# Patient Record
Sex: FEMALE | Race: AMERICAN INDIAN OR ALASKA NATIVE | NOT HISPANIC OR LATINO | URBAN - METROPOLITAN AREA
[De-identification: names, ages, dates, MRNs, and addresses within clinical notes are randomized per-mention and may not be internally consistent; named-entity substitution may affect disease eponyms.]

---

## 2019-09-29 ENCOUNTER — INPATIENT (INPATIENT)
Facility: HOSPITAL | Age: 49
LOS: 2 days | Discharge: HOME | End: 2019-10-02
Attending: INTERNAL MEDICINE | Admitting: INTERNAL MEDICINE
Payer: COMMERCIAL

## 2019-09-29 VITALS
SYSTOLIC BLOOD PRESSURE: 134 MMHG | RESPIRATION RATE: 22 BRPM | DIASTOLIC BLOOD PRESSURE: 86 MMHG | HEART RATE: 94 BPM | TEMPERATURE: 98 F | OXYGEN SATURATION: 100 %

## 2019-09-29 NOTE — ED ADULT TRIAGE NOTE - CHIEF COMPLAINT QUOTE
Pt. BIBEMS [facility] from Home. c/o Sudden onset of Shortness of Breath with Dizziness. Denies CP Nausea Vomiting Diaphoresis Palpitation rash cough Headache. PMHx HTN HLD Hyperthyroidism No sick contact No recent travel.

## 2019-09-30 DIAGNOSIS — E78.5 HYPERLIPIDEMIA, UNSPECIFIED: ICD-10-CM

## 2019-09-30 DIAGNOSIS — I45.10 UNSPECIFIED RIGHT BUNDLE-BRANCH BLOCK: ICD-10-CM

## 2019-09-30 DIAGNOSIS — E05.10 THYROTOXICOSIS WITH TOXIC SINGLE THYROID NODULE WITHOUT THYROTOXIC CRISIS OR STORM: ICD-10-CM

## 2019-09-30 DIAGNOSIS — I10 ESSENTIAL (PRIMARY) HYPERTENSION: ICD-10-CM

## 2019-09-30 DIAGNOSIS — Z29.9 ENCOUNTER FOR PROPHYLACTIC MEASURES, UNSPECIFIED: ICD-10-CM

## 2019-09-30 DIAGNOSIS — R06.02 SHORTNESS OF BREATH: ICD-10-CM

## 2019-09-30 LAB
ALBUMIN SERPL ELPH-MCNC: 4.6 G/DL — SIGNIFICANT CHANGE UP (ref 3.3–5)
ALP SERPL-CCNC: 60 U/L — SIGNIFICANT CHANGE UP (ref 40–120)
ALT FLD-CCNC: 21 U/L — SIGNIFICANT CHANGE UP (ref 4–33)
ANION GAP SERPL CALC-SCNC: 15 MMO/L — HIGH (ref 7–14)
APPEARANCE UR: CLEAR — SIGNIFICANT CHANGE UP
AST SERPL-CCNC: 17 U/L — SIGNIFICANT CHANGE UP (ref 4–32)
BACTERIA # UR AUTO: NEGATIVE — SIGNIFICANT CHANGE UP
BASOPHILS # BLD AUTO: 0.03 K/UL — SIGNIFICANT CHANGE UP (ref 0–0.2)
BASOPHILS NFR BLD AUTO: 0.2 % — SIGNIFICANT CHANGE UP (ref 0–2)
BILIRUB SERPL-MCNC: 0.4 MG/DL — SIGNIFICANT CHANGE UP (ref 0.2–1.2)
BILIRUB UR-MCNC: NEGATIVE — SIGNIFICANT CHANGE UP
BLOOD UR QL VISUAL: NEGATIVE — SIGNIFICANT CHANGE UP
BUN SERPL-MCNC: 15 MG/DL — SIGNIFICANT CHANGE UP (ref 7–23)
CALCIUM SERPL-MCNC: 10.5 MG/DL — SIGNIFICANT CHANGE UP (ref 8.4–10.5)
CHLORIDE SERPL-SCNC: 106 MMOL/L — SIGNIFICANT CHANGE UP (ref 98–107)
CO2 SERPL-SCNC: 23 MMOL/L — SIGNIFICANT CHANGE UP (ref 22–31)
COLOR SPEC: SIGNIFICANT CHANGE UP
CREAT SERPL-MCNC: 0.63 MG/DL — SIGNIFICANT CHANGE UP (ref 0.5–1.3)
D DIMER BLD IA.RAPID-MCNC: < 150 NG/ML — SIGNIFICANT CHANGE UP
EOSINOPHIL # BLD AUTO: 0.03 K/UL — SIGNIFICANT CHANGE UP (ref 0–0.5)
EOSINOPHIL NFR BLD AUTO: 0.2 % — SIGNIFICANT CHANGE UP (ref 0–6)
GLUCOSE SERPL-MCNC: 122 MG/DL — HIGH (ref 70–99)
GLUCOSE UR-MCNC: NEGATIVE — SIGNIFICANT CHANGE UP
HCT VFR BLD CALC: 46.6 % — HIGH (ref 34.5–45)
HGB BLD-MCNC: 14.2 G/DL — SIGNIFICANT CHANGE UP (ref 11.5–15.5)
HYALINE CASTS # UR AUTO: NEGATIVE — SIGNIFICANT CHANGE UP
IMM GRANULOCYTES NFR BLD AUTO: 0.4 % — SIGNIFICANT CHANGE UP (ref 0–1.5)
KETONES UR-MCNC: SIGNIFICANT CHANGE UP
LEUKOCYTE ESTERASE UR-ACNC: NEGATIVE — SIGNIFICANT CHANGE UP
LYMPHOCYTES # BLD AUTO: 17.2 % — SIGNIFICANT CHANGE UP (ref 13–44)
LYMPHOCYTES # BLD AUTO: 2.37 K/UL — SIGNIFICANT CHANGE UP (ref 1–3.3)
MCHC RBC-ENTMCNC: 27.4 PG — SIGNIFICANT CHANGE UP (ref 27–34)
MCHC RBC-ENTMCNC: 30.5 % — LOW (ref 32–36)
MCV RBC AUTO: 90 FL — SIGNIFICANT CHANGE UP (ref 80–100)
MONOCYTES # BLD AUTO: 0.93 K/UL — HIGH (ref 0–0.9)
MONOCYTES NFR BLD AUTO: 6.8 % — SIGNIFICANT CHANGE UP (ref 2–14)
NEUTROPHILS # BLD AUTO: 10.35 K/UL — HIGH (ref 1.8–7.4)
NEUTROPHILS NFR BLD AUTO: 75.2 % — SIGNIFICANT CHANGE UP (ref 43–77)
NITRITE UR-MCNC: NEGATIVE — SIGNIFICANT CHANGE UP
NRBC # FLD: 0 K/UL — SIGNIFICANT CHANGE UP (ref 0–0)
PH UR: 8 — SIGNIFICANT CHANGE UP (ref 5–8)
PLATELET # BLD AUTO: 324 K/UL — SIGNIFICANT CHANGE UP (ref 150–400)
PMV BLD: 9.9 FL — SIGNIFICANT CHANGE UP (ref 7–13)
POTASSIUM SERPL-MCNC: 4.1 MMOL/L — SIGNIFICANT CHANGE UP (ref 3.5–5.3)
POTASSIUM SERPL-SCNC: 4.1 MMOL/L — SIGNIFICANT CHANGE UP (ref 3.5–5.3)
PROT SERPL-MCNC: 7.7 G/DL — SIGNIFICANT CHANGE UP (ref 6–8.3)
PROT UR-MCNC: 20 — SIGNIFICANT CHANGE UP
RBC # BLD: 5.18 M/UL — SIGNIFICANT CHANGE UP (ref 3.8–5.2)
RBC # FLD: 12.7 % — SIGNIFICANT CHANGE UP (ref 10.3–14.5)
RBC CASTS # UR COMP ASSIST: SIGNIFICANT CHANGE UP (ref 0–?)
SODIUM SERPL-SCNC: 144 MMOL/L — SIGNIFICANT CHANGE UP (ref 135–145)
SP GR SPEC: 1.02 — SIGNIFICANT CHANGE UP (ref 1–1.04)
SQUAMOUS # UR AUTO: SIGNIFICANT CHANGE UP
TROPONIN T, HIGH SENSITIVITY: < 6 NG/L — SIGNIFICANT CHANGE UP (ref ?–14)
TSH SERPL-MCNC: 1.55 UIU/ML — SIGNIFICANT CHANGE UP (ref 0.27–4.2)
UROBILINOGEN FLD QL: NORMAL — SIGNIFICANT CHANGE UP
WBC # BLD: 13.77 K/UL — HIGH (ref 3.8–10.5)
WBC # FLD AUTO: 13.77 K/UL — HIGH (ref 3.8–10.5)
WBC UR QL: SIGNIFICANT CHANGE UP (ref 0–?)

## 2019-09-30 PROCEDURE — 71045 X-RAY EXAM CHEST 1 VIEW: CPT | Mod: 26

## 2019-09-30 PROCEDURE — 70491 CT SOFT TISSUE NECK W/DYE: CPT | Mod: 26

## 2019-09-30 RX ORDER — ATORVASTATIN CALCIUM 80 MG/1
10 TABLET, FILM COATED ORAL AT BEDTIME
Refills: 0 | Status: DISCONTINUED | OUTPATIENT
Start: 2019-09-30 | End: 2019-10-02

## 2019-09-30 RX ORDER — ATORVASTATIN CALCIUM 80 MG/1
1 TABLET, FILM COATED ORAL
Qty: 0 | Refills: 0 | DISCHARGE

## 2019-09-30 RX ORDER — ZOLPIDEM TARTRATE 10 MG/1
5 TABLET ORAL AT BEDTIME
Refills: 0 | Status: DISCONTINUED | OUTPATIENT
Start: 2019-09-30 | End: 2019-10-02

## 2019-09-30 RX ORDER — LOSARTAN POTASSIUM 100 MG/1
1 TABLET, FILM COATED ORAL
Qty: 0 | Refills: 0 | DISCHARGE

## 2019-09-30 RX ORDER — LOSARTAN POTASSIUM 100 MG/1
100 TABLET, FILM COATED ORAL DAILY
Refills: 0 | Status: DISCONTINUED | OUTPATIENT
Start: 2019-09-30 | End: 2019-10-02

## 2019-09-30 RX ORDER — ZOLPIDEM TARTRATE 10 MG/1
1 TABLET ORAL
Qty: 0 | Refills: 0 | DISCHARGE

## 2019-09-30 RX ADMIN — LOSARTAN POTASSIUM 100 MILLIGRAM(S): 100 TABLET, FILM COATED ORAL at 13:51

## 2019-09-30 RX ADMIN — ZOLPIDEM TARTRATE 5 MILLIGRAM(S): 10 TABLET ORAL at 22:51

## 2019-09-30 RX ADMIN — ATORVASTATIN CALCIUM 10 MILLIGRAM(S): 80 TABLET, FILM COATED ORAL at 21:47

## 2019-09-30 NOTE — H&P ADULT - NEGATIVE MUSCULOSKELETAL SYMPTOMS
no muscle cramps/no muscle weakness/no back pain/no leg pain L/no joint swelling/no myalgia/no arm pain L/no arm pain R/no arthralgia/no leg pain R

## 2019-09-30 NOTE — H&P ADULT - NSHPLABSRESULTS_GEN_ALL_CORE
14.2   13.77 )-----------( 324      ( 30 Sep 2019 01:07 )             46.6   09-30    144  |  106  |  15  ----------------------------<  122<H>  4.1   |  23  |  0.63    Ca    10.5      30 Sep 2019 01:07    TPro  7.7  /  Alb  4.6  /  TBili  0.4  /  DBili  x   /  AST  17  /  ALT  21  /  AlkPhos  60  09-30    Troponin: <6    D-dimer: <150    U/A: Normal    EKG: NSR @ 76 with RBBB    CXR: Clear lungs 14.2   13.77 )-----------( 324      ( 30 Sep 2019 01:07 )             46.6   09-30    144  |  106  |  15  ----------------------------<  122<H>  4.1   |  23  |  0.63    Ca    10.5      30 Sep 2019 01:07    TPro  7.7  /  Alb  4.6  /  TBili  0.4  /  DBili  x   /  AST  17  /  ALT  21  /  AlkPhos  60  09-30    Troponin: <6    D-dimer: <150    U/A: Normal    EKG: NSR @ 76 with RBBB.    CXR: Clear lungs

## 2019-09-30 NOTE — H&P ADULT - NEGATIVE OPHTHALMOLOGIC SYMPTOMS
no lacrimation R/no discharge R/no irritation L/no loss of vision R/no scleral injection R/no blurred vision L/no irritation R/no pain R/no diplopia/no photophobia/no lacrimation L/no loss of vision L/no scleral injection L/no blurred vision R/no discharge L/no pain L

## 2019-09-30 NOTE — H&P ADULT - MUSCULOSKELETAL
details… detailed exam ROM intact/normal/no joint swelling/no joint erythema/no joint warmth/normal strength/no calf tenderness

## 2019-09-30 NOTE — CHART NOTE - NSCHARTNOTEFT_GEN_A_CORE
[facility]  Head & Neck Surgery Procedure Note    Name:                  Oneil Jain        Surgeon:   Dante Linares MD	  				  Date of Procedure: 09/30/2019                       Assistant:  None    Record Number:	[id number]                             Anesthesia:  Topical Anesthesia     Preoperative diagnosis:	Acute maxillary sinusitis, unspecified (J01.00);  Acute Pansinusitis (J01.40)    Postoperative diagnosis:	Same    Procedure:	              Bilateral maxillary sinus endoscopy  (72601)    INDICATION:  The patient is a 48 y/o female with PMH of HTN, HLD, hyperthyroid with multiple thyroid nodules on Methimazole, arthritis who presents to [facility] ED complaining of SOB for 3 days and dizziness last night.  Pt states that she feels like air is "getting trapped in her airway" and reports orthopnea (sleeps with 2 pillows).  Pt admits to history of chronic stiff neck for which she receives cupping treatments and history of chronic headaches. Patient with chronic headaches/sinus headaches and post nasal drip.  ENT consulted for further evaluation and management. Pt reports she's had known multinodular goiter for 10+ years and has been on methimazole for a while now. Initially patient was on a higher dose of methimazole, but because she was well controlled, her dose was decreased over a year ago. She has been euthyroid since, per patient. At baseline she has mild shortness of breath and some discomfort when lying flat, but has had no acute respiratory issues. About 3-4 days ago she's had acute worsening of her shortness of breath, denies any fevers or other URI symptoms. Afebrile in ED. She denies any difficult swallowing. No changes in voice. No history of surgery or radiation to neck.     PROCEDURE:  The patient was seen and her nasal cavities were prepped and sprayed with topical neosynephrine anesthesia.   Following this, a zero degree flexible endoscope was passed into the left nasal vault, and passed posteriorly to the nasopharynx.  There was no evidence of any blood emanating from the left posterior superior lateral nasal wall. The scope was then slowly withdrawn and then rotated superiorly to visualize the middle turbinate and the inferior meatus and osteomeatal complex. The OMC on the left side appeared patent with no evidence of pus or obstruction.  The Maxillary sinus on the left side was then accessed through the inferior meatus.  The maxillary sinus had mild to moderate amount of mucoserous fluid within it without any evidence of masses or lesions.  The frontal duct was then inspected and appeared clear without any mucoid material flowing from it.  The Septum appeared straight.  The scope was then slowly withdrawn.  The zero degree endoscope was then introduced into the right nasal cavity and passed posteriorly to the nasopharynx. There were no masses visible.  There was no evidence of any bleeding emanating from the right posterior septum.  The endoscope was then rotated superiorly to visualize the middle turbinate and the inferior meatus and osteomeatal complex. The Maxillary sinus on the right side was then accessed via the inferior meatus.  There was a minimal amount of mucoserous fluid within the maxillary sinus with no evidence of any masses or pus.  Again the septum appeared to be straight.  The scope was then withdrawn.  The patient tolerated the procedure well.

## 2019-09-30 NOTE — ED PROVIDER NOTE - PHYSICAL EXAMINATION
General: NAD, good hygiene, well developed  HENT: Atraumatic, EOMI, no conjunctivae injection, moist mucosa, no post. oropharynx erythema, exudates  Neck: normal ROM and trachea midline   Cardiovascular: RRR, S1&2, no M or R, radial pulses equal and b/l  Respiratory: CTABL, no wheezes or crackles, no decreased breath sounds  Abdominal: soft and non-tender non distended, neg for guarding, malik's sign, rovsing's sign, mcburney's sign, no CVA tenderness   Extremities: no edema of the legs/feet, DP/PT equal b/l  Skin: warm, well perfused  Neurologic: nonfocal, AAOx3  Psych: normal mood and affect

## 2019-09-30 NOTE — CONSULT NOTE ADULT - SUBJECTIVE AND OBJECTIVE BOX
HPI:  "Pt is a 48 y/o female with PMH of HTN, HLD, hyperthyroid with multiple thyroid nodules on Methimazole, arthritis who presents to [facility] ED complaining of SOB for 3 days and dizziness last night.  Pt states that she feels like air is "getting trapped in her airway" and reports orthopnea (sleeps with 2 pillows).  Pt admits to history of chronic stiff neck for which she receives cupping treatments and history of chronic headaches. Patient with chronic headaches/sinus headaches and post nasal drip.  ENT consulted for further evaluation and management. Pt reports she's had known multinodular goiter for 10+ years and has been on methimazole for a while now. Initially patient was on a higher dose of methimazole, but because she was well controlled, her dose was decreased over a year ago. She has been euthyroid since, per patient. At baseline she has mild shortness of breath and some discomfort when lying flat, but has had no acute respiratory issues. About 3-4 days ago she's had acute worsening of her shortness of breath, denies any fevers or other URI symptoms. Afebrile in ED. She denies any difficult swallowing. No changes in voice. No history of surgery or radiation to neck.       PAST MEDICAL & SURGICAL HISTORY:  Arthritis  Hyperlipidemia  HTN (hypertension)  Thyroid nodule, toxic or with hyperthyroidism  No significant past surgical history    FAMILY HISTORY:  No pertinent family history in first degree relatives      MEDICATIONS  (STANDING):  atorvastatin 10 milliGRAM(s) Oral at bedtime  losartan 100 milliGRAM(s) Oral daily  methimazole 2.5 milliGRAM(s) Oral daily    MEDICATIONS  (PRN):  zolpidem 5 milliGRAM(s) Oral at bedtime PRN Insomnia  zolpidem 5 milliGRAM(s) Oral at bedtime PRN Insomnia    Allergies  No Known Allergies    Intolerances        REVIEW OF SYSTEMS:  CONSTITUTIONAL: No fever, weight loss, or fatigue  EYES: No eye pain, visual disturbances, or discharge  ENMT:  No difficulty hearing, tinnitus, vertigo; No sinus or throat pain  NECK: No pain or stiffness  BREASTS: No pain, masses, or nipple discharge  RESPIRATORY: No cough, wheezing, chills or hemoptysis; positive for shortness of breath  CARDIOVASCULAR: No chest pain, palpitations, dizziness, or leg swelling  GASTROINTESTINAL: No abdominal or epigastric pain. No nausea, vomiting, or hematemesis; No diarrhea or constipation. No melena or hematochezia.  GENITOURINARY: No dysuria, frequency, hematuria, or incontinence  NEUROLOGICAL: No headaches, memory loss, loss of strength, numbness, or tremors  SKIN: No itching, burning, rashes, or lesions   LYMPH NODES: No enlarged glands  ENDOCRINE: No heat or cold intolerance; No hair loss  MUSCULOSKELETAL: No joint pain or swelling; No muscle, back, or extremity pain  PSYCHIATRIC: No depression, anxiety, mood swings, or difficulty sleeping                          14.2   13.77 )-----------( 324      ( 30 Sep 2019 01:07 )             46.6     09-30    144  |  106  |  15  ----------------------------<  122<H>  4.1   |  23  |  0.63    Ca    10.5      30 Sep 2019 01:07    TPro  7.7  /  Alb  4.6  /  TBili  0.4  /  DBili  x   /  AST  17  /  ALT  21  /  AlkPhos  60  09-30      PHYSICAL EXAM:  Vital Signs Last 24 Hrs  T(C): 36.6 (30 Sep 2019 15:05), Max: 36.9 (29 Sep 2019 22:52)  T(F): 97.8 (30 Sep 2019 15:05), Max: 98.4 (29 Sep 2019 22:52)  HR: 83 (30 Sep 2019 15:05) (77 - 94)  BP: 145/65 (30 Sep 2019 15:05) (34/86 - 158/74)  BP(mean): 84 (30 Sep 2019 07:35) (84 - 84)  RR: 17 (30 Sep 2019 15:05) (17 - 22)  SpO2: 100% (30 Sep 2019 15:05) (99% - 100%)  Constitutional Normal: well nourished, well developed, non-dysmorphic, no acute distress  Psychiatric: age appropriate behavior, cooperative  Skin: no obvious skin lesions  Lymphatic: no cervical lymphadenopathy		  External Nose:  Normal, no structural deformities		  Anterior Nasal Cavity:	Normal mucosa, no turbinate hypertrophy, straight septum  Nasal/sinus endoscopy: maxillary sinus with mucoid fluid b/l via inferior meatusOral Cavity:  Good dentition, tongue midline, no lesions or ulcerations  Neck: No palpable lymphadenopathy, palpably thyroid gland, no tenderness  Pulmonary: No Acute Distress.   Neurologic: awake and alert      Flexible Fiberoptic Laryngoscopy: clear nasopharynx to glottis, tvc mobile b/l, airway patent      · Assessment / Plan		  48 y/o female with PMH of HTN, HLD, hyperthyroid with multiple thyroid nodules on methimazole, arthritis who presents to [facility] ED complaining of SOB for 3 days and dizziness.  Airway is widely patent in [place]nsocopy  -no acute ENT intervention needed  -further work up for SOB per medicine, possibly related to a recent URI given leukocytosis  -patient may benefit from a total thryoidectomy, given compressive symptoms, however, this can be scheduled as an outpatient  -recommend thyroid function tests while here as well as a thyroid US  -if any nodules >1cm or with suspicious features, recommend US guided FNA, which can be done as an outpatient  -patient would need clearance from endocrine/medicine prior to considering surgery, patient interested in following up with endocrine at [facility]  -continue methimazole per endo  -can follow up in clinic with any of our adult ENT head and neck attendings. Can call [phone] to schedule appt

## 2019-09-30 NOTE — ED ADULT NURSE NOTE - NSIMPLEMENTINTERV_GEN_ALL_ED
Implemented All Universal Safety Interventions:  [place] to call system. Call bell, personal items and telephone within reach. Instruct patient to call for assistance. Room bathroom lighting operational. Non-slip footwear when patient is off stretcher. Physically safe environment: no spills, clutter or unnecessary equipment. Stretcher in lowest position, wheels locked, appropriate side rails in place.

## 2019-09-30 NOTE — CONSULT NOTE ADULT - SUBJECTIVE AND OBJECTIVE BOX
Reason for Admission: SOB and dizziness sudden onset        History of Present Illness:     Pt is a 50 y/o female with PMH of HTN, HLD, hyperthyroid with multiple thyroid nodules on Methimazole, arthritis who presents to [facility] ED complaining of SOB for 3 days and dizziness last night. Pt reports 3 day history of sudden, intermittent episodes of SOB with difficulty breathing. Pt endocrinologist informed her that if thyroid nodules enlarged they could obstruct her airway and cause difficulty breathing for which she should call 911. Pt states that last night she became SOB with associated symptoms of dizziness, weakness, chills, diaphoresis, and "feeling faint"; resulting in her calling 911. Pt denies fall, LOC, trauma, slurred speech, facial droop, or arm weakness. Pt states that she feels like air is "getting trapped in her airway" and reports orthopnea (sleeps with 2 pillows). Pt denies sick contacts or having an episode like this before. Pt denies experiencing any pain. Pt also admits to 2 week history of intermittent palpitations. Pt admits to occasional bilateral leg swelling due to frequent travel and reports having returned from [facility] on August 17th after a 2 week trip (24h flights each way). Pt denies current calf swelling, calf tenderness, calf warmth. Pt admits to history of chronic stiff neck for which she receives cupping treatments and history of chronic headaches. Pt denies abnormal changes in weight, fatigue, throat pain, rhinorrhea, otalgia, sinus pressure, cough, wheezing, chest pain, orthopnea, abdominal pain, nausea, vomiting, diarrhea, dysuria, hematuria.     Review of Systems:  · Negative General Symptoms  no fever; no anorexia; no weight loss; no weight gain; no polyphagia; no polyuria; no polydipsia; no malaise; no fatigue    · General Symptoms  chills; sweating; weakness; See HPI    · Negative Skin Symptoms  no rash; no itching; no dryness; no change in size/color of mole; no tumor; no brittle nails; no pitted nails; no hair loss    · Negative Ophthalmologic Symptoms  no diplopia; no photophobia; no lacrimation L; no lacrimation R; no blurred vision L; no blurred vision R; no discharge L; no discharge R; no pain L; no pain R; no irritation L; no irritation R; no loss of vision L; no loss of vision R; no scleral injection L; no scleral injection R    · Negative ENMT Symptoms  no hearing difficulty; no ear pain; no tinnitus; no sinus symptoms; no nasal congestion; no nasal discharge; no nasal obstruction; no post-nasal discharge; no nose bleeds; no gum bleeding; no dry mouth; no throat pain; no dysphagia    · ENMT Symptoms  vertigo    · Negative Respiratory and Thorax Symptoms  no wheezing; no cough; no hemoptysis; no pleuritic chest pain    · Respiratory and Thorax Symptoms  dyspnea  See HPI    · Negative Cardiovascular Symptoms  no chest pain; no orthopnea; no paroxysmal nocturnal dyspnea; no peripheral edema; no claudication    · Cardiovascular Symptoms  palpitations; dyspnea on exertion; See HPI    · Negative Gastrointestinal Symptoms  no nausea; no vomiting; no diarrhea; no constipation; no change in bowel habits; no flatulence; no abdominal pain; no melena; no hematochezia          · Negative General Genitourinary Symptoms  no hematuria; no renal colic; no flank pain L; no flank pain R; no bladder infections; no incontinence; no dysuria; no urinary hesitancy; normal urinary frequency    · Negative Musculoskeletal Symptoms  no arthralgia; no joint swelling; no myalgia; no muscle cramps; no muscle weakness; no arm pain L; no arm pain R; no back pain; no leg pain L; no leg pain R    · Musculoskeletal Symptoms  arthritis; stiffness; neck pain; See HPI    · Negative Neurological Symptoms  no transient paralysis; no weakness; no paresthesias; no generalized seizures; no focal seizures; no syncope; no tremors; no loss of sensation; no difficulty walking; no loss of consciousness; no hemiparesis; no confusion; no facial palsy    · Neurological Symptoms  vertigo; headache; See HPI    · Negative Psychiatric Symptoms  no suicidal ideation; no depression; no anxiety; no insomnia; no memory loss; no paranoia; no mood swings; no agitation; no visual hallucinations; no auditory hallucinations; no hyperactivity    · Negative Hematology Symptoms  no gum bleeding; no nose bleeding; no skin lumps    · Negative Lymphatic Symptoms  no enlarged lymph nodes; no tender lymph nodes; no swelling of extremity    · Lymphatic Symptoms  tender lymph nodes    · Negative Endocrine Symptoms  no cold intolerance; no striae; no voice change    · Negative Allergic Reactions  no anaphylaxis; no respiratory distress; no photosensitivity; no urticaria    · Negative Allergy Types  no outdoor environmental allergies; no indoor environmental allergies; no reactions to medicines; no reactions to food; no reactions to animals; no reactions to insect bites    · Negative Immunological Symptoms  no recurring infections; no persistent infections; no recurring pneumonia        Allergies and Intolerances:        Allergies:  	No Known Allergies:     Home Medications:   * Patient Currently Takes Medications as of 30-Sep-2019 10:02 documented in Structured Notes  · 	losartan 100 mg oral tablet: Last Dose Taken:  , 1 tab(s) orally once a day  · 	methIMAzole 5 mg oral tablet: Last Dose Taken:  , 0.5 tab(s) orally once a day  · 	zolpidem 10 mg oral tablet: Last Dose Taken:  , 1 tab(s) orally once a day (at bedtime)  · 	atorvastatin 10 mg oral tablet: Last Dose Taken:  , 1 tab(s) orally once a day (at bedtime)    .    Patient History:   Past Medical, Past Surgical, and Family History:  PAST MEDICAL HISTORY:  Arthritis     HTN (hypertension)     Hyperlipidemia     Thyroid nodule, toxic or with hyperthyroidism.     No significant past surgical history.     No pertinent family history in first degree relatives.     No Pertinent Family History in first degree relatives of: Patient does not know biological family, unable to obtain family history.    Social History:  Social History (marital status, living situation, occupation, tobacco use, alcohol and drug use, and sexual history): Pt lives at home with family, walks under her own power, and works in office building. Pt admits to frequent travel.   	Currently smokes 1-2 cigarettes a day for 10 years,   Denies alcohol or drug usage.          Tobacco Screening:  · Core Measure Site  No    · Has the patient used tobacco in the past 30 days?  Yes    · Tobacco Cessation Education/Counseling  Offered and provided    · Tobacco Cessation Medication  Offered and patient declined      Risk Assessment:   Present on Admission:  Deep Venous Thrombosis  no    Pulmonary Embolus  no    Urinary Catheter  no    Central Venous Catheter/PICC Line  no    Surgical Site Incision  no    Pressure Ulcer(s)  no      Heart Failure:  Does this patient have a history of or has been diagnosed with heart failure? no.    HIV Screen (per [facility] Department of Health, HIV screening must be offered to every individual between ages 13 and 64)  Offered and patient declined    Screening uterine cytology (Pap smear) performed in last 3 years  yes        Height/Weight/BSA/BMI:  · Height (FEET)  5 Feet    · Height (INCHES)  6 Inch(s)    · Height (CENTIMETERS)  167.64 Centimeter(s)    ·    used    · Dosing Weight (KILOGRAMS)  75.7 kg    · Dosing Weight  (POUNDS)  166.8 Pound(s)    · BSA (m2)  1.85 Meter Squared    · BMI (kG/m2)  26.9      Physical Exam:  · Constitutional  detailed exam    · Constitutional Details  well-developed; well-groomed; well-nourished; no distress    · Eyes  detailed exam    · Eyes Details  PERRL; EOMI; conjunctiva clear    · ENMT  detailed exam    · ENMT Details  nose; mouth; pharynx    · Nose  normal; no discharge; no deviation    · Mouth  normal mouth and gums; moist    · Pharynx  normal; no redness; no discharge; no peritonsillar abscess    · Tonsils  normal; no redness; no swelling; no discharge    · Neck  detailed exam    · Neck Details  thyroid nodule; 1 palpable left sided inferior thyroid nodule, 2 right sided nodules, mid and inferior region.    · Breasts  not examined    · Back  detailed exam    · Back Details  normal; normal shape; ROM intact; strength intact    · Respiratory  detailed exam    · Respiratory Details  normal; airway patent; breath sounds equal; good air movement; respirations non-labored; clear to auscultation bilaterally; no chest wall tenderness; no intercostal retractions; no rales; no rhonchi; no subcutaneous emphysema; no wheezes    · Respiratory Comments  No stridor    · Cardiovascular  detailed exam    · Cardiovascular Details  regular rate and rhythm  no rub  normal PMI          · Cardiovascular Details  positive S1; positive S2; murmur          · Murmur Timing  systolic    · Character of Systolic Murmur  murmur loudness: I/VI    · Gastrointestinal  detailed exam    · GI Normal  normal; soft; nontender; no distention; no masses palpable; bowel sounds normal; no bruit; no organomegaly    · Genitourinary  not examined    · Rectal  not examined    · Extremities  detailed exam    · Extremities Details  normal; no clubbing; no cyanosis; no pedal edema    · Vascular  detailed exam    · Carotid Pulse  right normal; left normal    · Radial Pulse  right normal; left normal    · DP Pulse  right normal; left normal    · Neurological  detailed exam    · Neurological Details  alert and oriented x 3; responds to pain; responds to verbal commands; sensation intact; deep reflexes intact; cranial nerves intact; no spontaneous movement; superficial reflexes intact; normal strength    · Mental Status  A&Ox3    · Cranial Nerve  2-12 intact    · Motor  5/5 strength RUE, LUE, RLE, LLE    · Sensory  Sensation intact bilaterally    · Skin  detailed exam    · Skin Details  warm and dry; color normal    · Lymph Nodes  detailed exam    · Lymphatic Details  posterior cervical L; posterior cervical R; anterior cervical L; anterior cervical R    · Posterior Cervical L  normal    · Posterior Cervical R  normal    · Anterior Cervical L  normal    · Anterior Cervical R  normal    · Musculoskeletal  detailed exam    · Musculoskeletal Details  normal; ROM intact; no joint swelling; no joint erythema; no joint warmth; no calf tenderness; normal strength    · Psychiatric  detailed exam    · Psychiatric Details  normal affect; normal behavior        Labs and Results:  Labs, Radiology, Cardiology, and Other Results: 14.2   	13.77 )-----------( 324      ( 30 Sep 2019 01:07 )  	           46.6   	09-30    	144  |  106  |  15  	----------------------------<  122<H>  	4.1   |  23  |  0.63    	Ca    10.5      30 Sep 2019 01:07    	TPro  7.7  /  Alb  4.6  /  TBili  0.4  /  DBili  x   /  AST  17  /  ALT  21  /  AlkPhos  60  09-30    	Troponin: <6    	D-dimer: <150    	U/A: Normal    	EKG: NSR @ 76 with RBBB.    	CXR: Clear lungs          Assessment and Plan:   Assessment:  · Assessment      Pt is a 50 y/o female with PMH of HTN, HLD, hyperthyroid with multiple thyroid nodules on methimazole, arthritis who presents to [facility] ED complaining of SOB for 3 days and dizziness requiring admission for enlarged thyroid nodules.    Problem/Plan - 1:  ·  Problem: Thyroid nodule, toxic or with hyperthyroidism.  Plan: ct soft tissue , endo f/u , ENT    Problem/Plan - 2:  ·  Problem: Right bundle branch block.  Plan: Patient known to have an EKG abnormality for about 10 years.  WIll attempt to obtain old EKG from PMD.     Problem/Plan - 3:  ·  Problem: Essential hypertension.  Plan: Continue Losartan  Monitor BP medications and adjust as necessary  DASH diet.     Problem/Plan - 4:  ·  Problem: Hyperlipidemia.  Plan: Continue atorvastatin  Lipid panel.

## 2019-09-30 NOTE — H&P ADULT - PROBLEM SELECTOR PLAN 2
Continue Losartan  Monitor BP medications and adjust as necessary  DASH diet Patient known to have an EKG abnormality for about 10 years.  WIll attempt to obtain old EKG from PMD.

## 2019-09-30 NOTE — H&P ADULT - NS MD HP PULSE RADIAL
Fransisca from formerly [facility] was called back to clarify the medications Dr. Rosas ordered for the patient. Fransisca was needing more specific diagnoses for the medications. Fransisca was given a verbal for the fluconazole pill for the yeast growing around her vagina and the Calmoseptine for perianal/ perineal skin irritation from radiation treatments. Fransisca was given the direct phone number to call back with any questions or concerns.    right normal/left normal

## 2019-09-30 NOTE — H&P ADULT - NEGATIVE NEUROLOGICAL SYMPTOMS
no transient paralysis/no difficulty walking/no loss of consciousness/no hemiparesis/no paresthesias/no syncope/no loss of sensation/no weakness/no generalized seizures/no facial palsy/no focal seizures/no confusion/no tremors

## 2019-09-30 NOTE — H&P ADULT - NEGATIVE SKIN SYMPTOMS
no tumor/no hair loss/no rash/no change in size/color of mole/no pitted nails/no itching/no dryness/no brittle nails

## 2019-09-30 NOTE — H&P ADULT - PROBLEM SELECTOR PLAN 3
Continue atorvastatin  Lipid panel Continue Losartan  Monitor BP medications and adjust as necessary  DASH diet

## 2019-09-30 NOTE — ED PROVIDER NOTE - PROGRESS NOTE DETAILS
Zia Romero, PGY 2: patient states the shortness of breath persists when she is awake, will admit for stress test w. HEART score of 4.

## 2019-09-30 NOTE — CONSULT NOTE ADULT - ASSESSMENT
50 y/o female with PMH of HTN, HLD, hyperthyroid with multiple thyroid nodules on methimazole, arthritis who presents to [facility] ED complaining of SOB for 3 days and dizziness. Scope exam wnl. Airway patent. CT scan reviewed. Patient has enlarged thyroid with mild compression of her trachea + mild narrowing. Pts difficulty breathing and difficulty lying flat may partially be explained by the enlarged thryoid, but unlikely to be the cause of her acute onset SOB.   -no acute ENT intervention needed  -further work up for SOB per medicine  -patient would benefit from a total thryoidectomy, given compressive symptoms, however, this can be scheduled as an outpatient  -patient would need clearance from endocrine/medicine prior to considering surgery, which can be done as outpatient  -continue methimazole per endo  -recommend checking TSH to make sure she is euthryoid  -can follow up in clinic with any of our adult ENT attendings. [phone] 50 y/o female with PMH of HTN, HLD, hyperthyroid with multiple thyroid nodules on methimazole, arthritis who presents to [facility] ED complaining of SOB for 3 days and dizziness. Scope exam done by PA was wnl. Airway patent. CT scan reviewed. Patient has enlarged thyroid with mild compression of her trachea + mild narrowing. Pts difficulty breathing and difficulty lying flat may partially be explained by the enlarged thyroid, but unlikely to be the cause of her acute onset SOB, she's likely had the mild tracheal compression for a while.   -no acute ENT intervention needed  -further work up for SOB per medicine, possibly related to a recent URI given leukocytosis  -patient may benefit from a total thryoidectomy, given compressive symptoms, however, this can be scheduled as an outpatient  -recommend thyroid function tests while here as well as a thyroid US  -if any nodules >1cm or with suspicious features, recommend US guided FNA, which can be done as an outpatient  -patient would need clearance from endocrine/medicine prior to considering surgery, patient interested in following up with endocrine at [facility]  -continue methimazole per endo  -will rescope in PM with Dr. Linares  -can follow up in clinic with any of our adult ENT head and neck attendings. Can call [phone] to schedule appt

## 2019-09-30 NOTE — H&P ADULT - NEGATIVE GASTROINTESTINAL SYMPTOMS
no nausea no diarrhea/no melena/no constipation/no change in bowel habits/no hematochezia/no abdominal pain/no nausea/no vomiting no constipation/no abdominal pain/no flatulence/no change in bowel habits/no hematochezia/no diarrhea/no melena/no nausea/no vomiting

## 2019-09-30 NOTE — H&P ADULT - NEGATIVE GENERAL GENITOURINARY SYMPTOMS
normal urinary frequency/no incontinence/no urinary hesitancy/no bladder infections/no hematuria/no dysuria/no renal colic/no flank pain R/no flank pain L

## 2019-09-30 NOTE — H&P ADULT - NEGATIVE ENMT SYMPTOMS
no sinus symptoms/no gum bleeding/no tinnitus/no nasal discharge/no hearing difficulty/no throat pain/no dysphagia/no ear pain/no nasal congestion/no nasal obstruction/no post-nasal discharge/no nose bleeds/no dry mouth

## 2019-09-30 NOTE — CONSULT NOTE ADULT - SUBJECTIVE AND OBJECTIVE BOX
Reason for Consultation: SOB, multinodular goiter  Requested by: ED    Patient is a 49y old  Female who presents with a chief complaint of SOB and dizziness sudden onset (30 Sep 2019 08:52)    HPI:  "Pt is a 50 y/o female with PMH of HTN, HLD, hyperthyroid with multiple thyroid nodules on Methimazole, arthritis who presents to [facility] ED complaining of SOB for 3 days and dizziness last night. Pt reports 3 day history of sudden, intermittent episodes of SOB with difficulty breathing. Pt endocrinologist informed her that if thyroid nodules enlarged they could obstruct her airway and cause difficulty breathing for which she should call 911. Pt states that last night she became SOB with associated symptoms of dizziness, weakness, chills, diaphoresis, and "feeling faint"; resulting in her calling 911. Pt denies fall, LOC, trauma, slurred speech, facial droop, or arm weakness. Pt states that she feels like air is "getting trapped in her airway" and reports orthopnea (sleeps with 2 pillows). Pt denies sick contacts or having an episode like this before. Pt denies experiencing any pain. Pt also admits to 2 week history of intermittent palpitations. Pt admits to occasional bilateral leg swelling due to frequent travel and reports having returned from [facility] on August 17th after a 2 week trip (24h flights each way). Pt denies current calf swelling, calf tenderness, calf warmth. Pt admits to history of chronic stiff neck for which she receives cupping treatments and history of chronic headaches. Pt denies abnormal changes in weight, fatigue, throat pain, rhinorrhea, otalgia, sinus pressure, cough, wheezing, chest pain, orthopnea, abdominal pain, nausea, vomiting, diarrhea, dysuria, hematuria. (30 Sep 2019 08:52)"    ENT consulted for further evaluation and management.       PAST MEDICAL & SURGICAL HISTORY:  Arthritis  Hyperlipidemia  HTN (hypertension)  Thyroid nodule, toxic or with hyperthyroidism  No significant past surgical history    FAMILY HISTORY:  No pertinent family history in first degree relatives      MEDICATIONS  (STANDING):  atorvastatin 10 milliGRAM(s) Oral at bedtime  losartan 100 milliGRAM(s) Oral daily  methimazole 2.5 milliGRAM(s) Oral daily    MEDICATIONS  (PRN):  zolpidem 5 milliGRAM(s) Oral at bedtime PRN Insomnia  zolpidem 5 milliGRAM(s) Oral at bedtime PRN Insomnia    Allergies    No Known Allergies    Intolerances        REVIEW OF SYSTEMS:    CONSTITUTIONAL: No fever, weight loss, or fatigue  EYES: No eye pain, visual disturbances, or discharge  ENMT:  No difficulty hearing, tinnitus, vertigo; No sinus or throat pain  NECK: No pain or stiffness  BREASTS: No pain, masses, or nipple discharge  RESPIRATORY: No cough, wheezing, chills or hemoptysis; No shortness of breath  CARDIOVASCULAR: No chest pain, palpitations, dizziness, or leg swelling  GASTROINTESTINAL: No abdominal or epigastric pain. No nausea, vomiting, or hematemesis; No diarrhea or constipation. No melena or hematochezia.  GENITOURINARY: No dysuria, frequency, hematuria, or incontinence  NEUROLOGICAL: No headaches, memory loss, loss of strength, numbness, or tremors  SKIN: No itching, burning, rashes, or lesions   LYMPH NODES: No enlarged glands  ENDOCRINE: No heat or cold intolerance; No hair loss  MUSCULOSKELETAL: No joint pain or swelling; No muscle, back, or extremity pain  PSYCHIATRIC: No depression, anxiety, mood swings, or difficulty sleeping                          14.2   13.77 )-----------( 324      ( 30 Sep 2019 01:07 )             46.6     09-30    144  |  106  |  15  ----------------------------<  122<H>  4.1   |  23  |  0.63    Ca    10.5      30 Sep 2019 01:07    TPro  7.7  /  Alb  4.6  /  TBili  0.4  /  DBili  x   /  AST  17  /  ALT  21  /  AlkPhos  60  09-30    Vital Signs Last 24 Hrs  T(C): 36.6 (30 Sep 2019 15:05), Max: 36.9 (29 Sep 2019 22:52)  T(F): 97.8 (30 Sep 2019 15:05), Max: 98.4 (29 Sep 2019 22:52)  HR: 83 (30 Sep 2019 15:05) (77 - 94)  BP: 145/65 (30 Sep 2019 15:05) (34/86 - 158/74)  BP(mean): 84 (30 Sep 2019 07:35) (84 - 84)  RR: 17 (30 Sep 2019 15:05) (17 - 22)  SpO2: 100% (30 Sep 2019 15:05) (99% - 100%)      PHYSICAL EXAM:  Constitutional Normal: well nourished, well developed, non-dysmorphic, no acute distress    Psychiatric: age appropriate behavior, cooperative    Skin: no obvious skin lesions    Lymphatic: no cervical lymphadenopathy    Left EAC: Normal, No cerumen, no exostoses   Right EAC: Normal, No cerumen, no exostoses     Left Tympanic Membrane: Normal, Clear, No effusion  Right Tympanic Membrane: Normal, Clear, No effusion			    External Nose:  Normal, no structural deformities  		  Anterior Nasal Cavity:	Normal mucosa, no turbinate hypertrophy, straight septum  					  Oral Cavity:  Good dentition, tongue midline, no lesions or ulcerations    Tonsilar Size: 2+ bilaterally    Neck: No palpable lymphadenopathy    Tracheostomy Site: Normal with no evidence of breakdown or excoriation  	Tracheostomy size and type:    Pulmonary: No Acute Distress.     Neurologic: awake and alert Reason for Consultation: SOB, multinodular goiter  Requested by: ED    Patient is a 49y old  Female who presents with a chief complaint of SOB and dizziness sudden onset (30 Sep 2019 08:52)    HPI:  "Pt is a 48 y/o female with PMH of HTN, HLD, hyperthyroid with multiple thyroid nodules on Methimazole, arthritis who presents to [facility] ED complaining of SOB for 3 days and dizziness last night. Pt reports 3 day history of sudden, intermittent episodes of SOB with difficulty breathing. Pt endocrinologist informed her that if thyroid nodules enlarged they could obstruct her airway and cause difficulty breathing for which she should call 911. Pt states that last night she became SOB with associated symptoms of dizziness, weakness, chills, diaphoresis, and "feeling faint"; resulting in her calling 911. Pt denies fall, LOC, trauma, slurred speech, facial droop, or arm weakness. Pt states that she feels like air is "getting trapped in her airway" and reports orthopnea (sleeps with 2 pillows). Pt denies sick contacts or having an episode like this before. Pt denies experiencing any pain. Pt also admits to 2 week history of intermittent palpitations. Pt admits to occasional bilateral leg swelling due to frequent travel and reports having returned from [facility] on August 17th after a 2 week trip (24h flights each way). Pt denies current calf swelling, calf tenderness, calf warmth. Pt admits to history of chronic stiff neck for which she receives cupping treatments and history of chronic headaches. Pt denies abnormal changes in weight, fatigue, throat pain, rhinorrhea, otalgia, sinus pressure, cough, wheezing, chest pain, orthopnea, abdominal pain, nausea, vomiting, diarrhea, dysuria, hematuria. (30 Sep 2019 08:52)"    ENT consulted for further evaluation and management. Pt reports she's had known multinodular goiter for 10+ years and has been on methimazole for a while now. Initially patient was on a higher dose of methimazole, but because she was well controlled, her dose was decreased over a year ago. She has been euthyroid since, per patient. At baseline she has mild shortness of breath and some discomfort when lying flat, but has had no acute respiratory issues. About 3-4 days ago she's had acute worsening of her shortness of breath, denies any fevers or other URI symptoms. Afebrile in ED. She denies any difficult swallowing. No changes in voice. No history of surgery or radiation to neck.       PAST MEDICAL & SURGICAL HISTORY:  Arthritis  Hyperlipidemia  HTN (hypertension)  Thyroid nodule, toxic or with hyperthyroidism  No significant past surgical history    FAMILY HISTORY:  No pertinent family history in first degree relatives      MEDICATIONS  (STANDING):  atorvastatin 10 milliGRAM(s) Oral at bedtime  losartan 100 milliGRAM(s) Oral daily  methimazole 2.5 milliGRAM(s) Oral daily    MEDICATIONS  (PRN):  zolpidem 5 milliGRAM(s) Oral at bedtime PRN Insomnia  zolpidem 5 milliGRAM(s) Oral at bedtime PRN Insomnia    Allergies    No Known Allergies    Intolerances        REVIEW OF SYSTEMS:    CONSTITUTIONAL: No fever, weight loss, or fatigue  EYES: No eye pain, visual disturbances, or discharge  ENMT:  No difficulty hearing, tinnitus, vertigo; No sinus or throat pain  NECK: No pain or stiffness  BREASTS: No pain, masses, or nipple discharge  RESPIRATORY: No cough, wheezing, chills or hemoptysis; positive for shortness of breath  CARDIOVASCULAR: No chest pain, palpitations, dizziness, or leg swelling  GASTROINTESTINAL: No abdominal or epigastric pain. No nausea, vomiting, or hematemesis; No diarrhea or constipation. No melena or hematochezia.  GENITOURINARY: No dysuria, frequency, hematuria, or incontinence  NEUROLOGICAL: No headaches, memory loss, loss of strength, numbness, or tremors  SKIN: No itching, burning, rashes, or lesions   LYMPH NODES: No enlarged glands  ENDOCRINE: No heat or cold intolerance; No hair loss  MUSCULOSKELETAL: No joint pain or swelling; No muscle, back, or extremity pain  PSYCHIATRIC: No depression, anxiety, mood swings, or difficulty sleeping                          14.2   13.77 )-----------( 324      ( 30 Sep 2019 01:07 )             46.6     09-30    144  |  106  |  15  ----------------------------<  122<H>  4.1   |  23  |  0.63    Ca    10.5      30 Sep 2019 01:07    TPro  7.7  /  Alb  4.6  /  TBili  0.4  /  DBili  x   /  AST  17  /  ALT  21  /  AlkPhos  60  09-30    Vital Signs Last 24 Hrs  T(C): 36.6 (30 Sep 2019 15:05), Max: 36.9 (29 Sep 2019 22:52)  T(F): 97.8 (30 Sep 2019 15:05), Max: 98.4 (29 Sep 2019 22:52)  HR: 83 (30 Sep 2019 15:05) (77 - 94)  BP: 145/65 (30 Sep 2019 15:05) (34/86 - 158/74)  BP(mean): 84 (30 Sep 2019 07:35) (84 - 84)  RR: 17 (30 Sep 2019 15:05) (17 - 22)  SpO2: 100% (30 Sep 2019 15:05) (99% - 100%)      PHYSICAL EXAM:  Constitutional Normal: well nourished, well developed, non-dysmorphic, no acute distress    Psychiatric: age appropriate behavior, cooperative    Skin: no obvious skin lesions    Lymphatic: no cervical lymphadenopathy		    External Nose:  Normal, no structural deformities  		  Anterior Nasal Cavity:	Normal mucosa, no turbinate hypertrophy, straight septum  					  Oral Cavity:  Good dentition, tongue midline, no lesions or ulcerations    Neck: No palpable lymphadenopathy, palpably thyroid gland, no tenderness    Pulmonary: No Acute Distress.     Neurologic: awake and alert

## 2019-09-30 NOTE — H&P ADULT - PROBLEM SELECTOR PLAN 1
CT soft tissue neck with IV contrast ordered  Endocrine c/s Admit to medicine  Concern that goiter could be causing compressive symptoms  CT soft tissue neck with IV contrast ordered  Consider Thoracic/ENT c/s  Consider Endocrine follow up

## 2019-09-30 NOTE — H&P ADULT - NECK DETAILS
thyroid nodule/1 palpable left sided thyroid nodule, 2 right sided nodules thyroid nodule/1 palpable left sided inferior thyroid nodule, 2 right sided nodules, mid and inferior region.

## 2019-09-30 NOTE — H&P ADULT - NSICDXPASTMEDICALHX_GEN_ALL_CORE_FT
PAST MEDICAL HISTORY:  Arthritis     HTN (hypertension)     Hyperlipidemia     Thyroid nodule, toxic or with hyperthyroidism

## 2019-09-30 NOTE — ED ADULT NURSE NOTE - OBJECTIVE STATEMENT
Clarita RN: Patient received in room #1 c/o shortness of breath for several days worsening 10pm last night. Patient A&Ox3, denies any pain currently. Patient reports hx. "nodule to my thyroid", HTN, HLD; reports palpitations earlier in the week which have currently subsided. Patient reports dizziness when standing up, denies any LOC. NSR on CM. Patient currently on RA, VS as noted. 20G IV Placed in left wrist, labs drawn and sent. Report given to RUFINO Solis. Will monitor.

## 2019-09-30 NOTE — H&P ADULT - NEGATIVE GENERAL SYMPTOMS
no polyuria/no polydipsia/no polyphagia/no fever/no weight gain/no anorexia/no malaise/no weight loss/no fatigue

## 2019-09-30 NOTE — H&P ADULT - ATTENDING COMMENTS
Patient was seen and examined,interim events noted,labs and radiology studies reviewed.  Bryce Vuong MD,FACC.  [address] [place] [address].  [id number].  [phone]

## 2019-09-30 NOTE — H&P ADULT - NSHPOUTPATIENTPROVIDERS_GEN_ALL_CORE
PCP: Dr. Hakan Griffith. [address] [place] [address]. [phone]  Endocrinologist: Dr. Kika Armstrong. [id number] [facility] [address] [facility], [place] [address] [phone]

## 2019-09-30 NOTE — H&P ADULT - ASSESSMENT
Pt is a 50 y/o female with PMH of HTN, HLD, hyperthyroid with multiple thyroid nodules on methimazole, arthritis who presents to [facility] ED complaining of SOB for 3 days and dizziness requiring admission for enlarged thyroid nodules.

## 2019-09-30 NOTE — H&P ADULT - RS GEN PE MLT RESP DETAILS PC
no subcutaneous emphysema/no intercostal retractions/no chest wall tenderness/good air movement/no rales/no wheezes/normal/breath sounds equal/airway patent/no rhonchi/respirations non-labored/clear to auscultation bilaterally

## 2019-09-30 NOTE — ED PROVIDER NOTE - NS ED ROS FT
GENERAL: No fever or chills, weight changes, nightsweats  EYES: no change in vision  HEENT: no dysplasia, odynophagia, ear pain, rhinorrhea, epistasis   CARDIAC: no chest pain, palpitation   PULMONARY: no cough  GI: no abdominal pain, no nausea or no vomiting, no diarrhea or constipation  : No changes in urination for pain/freq.   SKIN: no rashes   NEURO: no headache, numbness/tingling, extremity weakness   MSK: No joint pain

## 2019-09-30 NOTE — H&P ADULT - HISTORY OF PRESENT ILLNESS
Pt is a 48 y/o female with PMH of HTN, HLD, multiple thyroid nodules on methimazole, arthritis who presents to [facility] ED complaining of SOB for 3 days and dizziness last night. Pt reports 3 day history of sudden, intermittent episodes of SOB with difficulty breathing. Pt endocrinologist informed her that if thyroid nodules enlarged they could obstruct her airway and cause difficulty breathing for which she should call 911. Pt states that last night she became SOB with associated symptoms of dizziness, weakness, chills, diaphoresis, and "feeling faint"; resulting in her calling 911. Pt denies fall, LOC, trauma, slurred speech, facial droop, or arm weakness. Pt states that she feels like air is "getting trapped in her airway" when she takes a breath. Pt denies sick contacts or having an episode like this before. Pt denies experiencing any pain. Pt also admits to 2 week history of intermittent palpitations. Pt admits to occasional bilateral leg swelling due to frequent travel and reports having returned from [facility] on August 17th after a 2 week trip (24h flights each way). Pt denies current calf swelling, calf tenderness, calf warmth. Pt admits to history of chronic stiff neck for which she receives cupping treatments and history of chronic headaches. Pt denies abnormal changes in weight, fatigue, throat pain, rhinorrhea, otalgia, sinus pressure, cough, wheezing, chest pain, orthopnea, abdominal pain, nausea, vomiting, diarrhea, dysuria, hematuria. Pt is a 50 y/o female with PMH of HTN, HLD, hyperthyroid with multiple thyroid nodules on methimazole, arthritis who presents to [facility] ED complaining of SOB for 3 days and dizziness last night. Pt reports 3 day history of sudden, intermittent episodes of SOB with difficulty breathing. Pt endocrinologist informed her that if thyroid nodules enlarged they could obstruct her airway and cause difficulty breathing for which she should call 911. Pt states that last night she became SOB with associated symptoms of dizziness, weakness, chills, diaphoresis, and "feeling faint"; resulting in her calling 911. Pt denies fall, LOC, trauma, slurred speech, facial droop, or arm weakness. Pt states that she feels like air is "getting trapped in her airway" and reports orthopnea (sleeps with 2 pillows). Pt denies sick contacts or having an episode like this before. Pt denies experiencing any pain. Pt also admits to 2 week history of intermittent palpitations. Pt admits to occasional bilateral leg swelling due to frequent travel and reports having returned from [facility] on August 17th after a 2 week trip (24h flights each way). Pt denies current calf swelling, calf tenderness, calf warmth. Pt admits to history of chronic stiff neck for which she receives cupping treatments and history of chronic headaches. Pt denies abnormal changes in weight, fatigue, throat pain, rhinorrhea, otalgia, sinus pressure, cough, wheezing, chest pain, orthopnea, abdominal pain, nausea, vomiting, diarrhea, dysuria, hematuria. Pt is a 50 y/o female with PMH of HTN, HLD, hyperthyroid with multiple thyroid nodules on Methimazole, arthritis who presents to [facility] ED complaining of SOB for 3 days and dizziness last night. Pt reports 3 day history of sudden, intermittent episodes of SOB with difficulty breathing. Pt endocrinologist informed her that if thyroid nodules enlarged they could obstruct her airway and cause difficulty breathing for which she should call 911. Pt states that last night she became SOB with associated symptoms of dizziness, weakness, chills, diaphoresis, and "feeling faint"; resulting in her calling 911. Pt denies fall, LOC, trauma, slurred speech, facial droop, or arm weakness. Pt states that she feels like air is "getting trapped in her airway" and reports orthopnea (sleeps with 2 pillows). Pt denies sick contacts or having an episode like this before. Pt denies experiencing any pain. Pt also admits to 2 week history of intermittent palpitations. Pt admits to occasional bilateral leg swelling due to frequent travel and reports having returned from [facility] on August 17th after a 2 week trip (24h flights each way). Pt denies current calf swelling, calf tenderness, calf warmth. Pt admits to history of chronic stiff neck for which she receives cupping treatments and history of chronic headaches. Pt denies abnormal changes in weight, fatigue, throat pain, rhinorrhea, otalgia, sinus pressure, cough, wheezing, chest pain, orthopnea, abdominal pain, nausea, vomiting, diarrhea, dysuria, hematuria.

## 2019-09-30 NOTE — CHART NOTE - NSCHARTNOTEFT_GEN_A_CORE
[facility]  Head & Neck Surgery Procedure Note      Name:                  Oneil Jain        Surgeon:   Dante Linares MD	  				  Date of Procedure: 09/30/2019                       Assistant:  None    Record Number:	[id number]                             Anesthesia:  Topical Anesthesia       Preoperative diagnosis:	Dysphagia, unspecified (R13.10)  	  Postoperative diagnosis:	Dysphagia, unspecified (R13.10)    Procedure:		Flexible Fiberoptic Laryngoscopy  (45999)    INDICATION:  The patient is a 50 y/o female with PMH of HTN, HLD, hyperthyroid with multiple thyroid nodules on Methimazole, arthritis who presents to [facility] ED complaining of SOB for 3 days and dizziness last night.  Pt states that she feels like air is "getting trapped in her airway" and reports orthopnea (sleeps with 2 pillows).  Pt admits to history of chronic stiff neck for which she receives cupping treatments and history of chronic headaches. Patient with chronic headaches/sinus headaches and post nasal drip.  ENT consulted for further evaluation and management. Pt reports she's had known multinodular goiter for 10+ years and has been on methimazole for a while now. Initially patient was on a higher dose of methimazole, but because she was well controlled, her dose was decreased over a year ago. She has been euthyroid since, per patient. At baseline she has mild shortness of breath and some discomfort when lying flat, but has had no acute respiratory issues. About 3-4 days ago she's had acute worsening of her shortness of breath, denies any fevers or other URI symptoms. Afebrile in ED. She denies any difficult swallowing. No changes in voice. No history of surgery or radiation to neck.     PROCEDURE: The patient was seen and her bilateral nasal cavities were prepped and sprayed with topical anesthesia of neosynephrine.   Following this, a fiberoptic flexible laryngoscope was passed into the left nasal cavity, and then slowly and meticulously moved posteriorly to the nasopharynx.  There was no evidence of clotted blood within the left anterior and posterior superior lateral nasal wall. There was clear mucous within the nasal cavity.  Once at nasopharynx the skull base and lateral walls of the eustachian tubes were examined for lesions and masses.  There was no blood or masses within the nasopharynx. There was mild prominence of the nasopharyngeal soft tissue consistent with inflammatory reaction.  The fiberoptic endoscope was then carefully directed inferiorly and moved to the hypopharynx and glottis.  There was no fullness of the base of tongue.  There was 1-2+ prominence of the tonsils bilaterally (equally) and without exudate. There was no evidence of retropharyngeal erythema or edema.  There was mild  diffuse erythema  of the pharyngeal wall and supraglottic structure consistent with GERD.  The true vocal cords appeared to be mobile bilaterally.  There was no evidence of foreign body or pooling of secretions, or obvious aspiration or penetration of liquid into the glottis.  The airway was widely patent. The patient tolerated the procedure well..

## 2019-09-30 NOTE — H&P ADULT - NEUROLOGICAL DETAILS
no spontaneous movement/sensation intact/cranial nerves intact/superficial reflexes intact/deep reflexes intact/alert and oriented x 3/responds to verbal commands/responds to pain/normal strength

## 2019-09-30 NOTE — ED PROVIDER NOTE - CLINICAL SUMMARY MEDICAL DECISION MAKING FREE TEXT BOX
49F, p.w acute shortness of breath, h.o htn, thyroid, HLD, smoker, no cough or fever, tachycardic on exam, no LE swelling or pain, lower risk of PE, will get Dimer, cxr, unlikely anaphylasis or infectious. concerning for atypical acs. will get trop and labs. sat on ra 99%. no neck swelling or airway compromise

## 2019-09-30 NOTE — H&P ADULT - NSHPSOCIALHISTORY_GEN_ALL_CORE
Pt lives at home with family, walks under her own power, and works in office building. Pt admits to frequent travel. Currently smokes 1-2 cigarettes a day for 10 years, denies alcohol or drug usage. Pt lives at home with family, walks under her own power, and works in office building. Pt admits to frequent travel.   Currently smokes 1-2 cigarettes a day for 10 years,   Denies alcohol or drug usage.

## 2019-09-30 NOTE — H&P ADULT - NEGATIVE PSYCHIATRIC SYMPTOMS
no depression/no insomnia/no paranoia/no mood swings/no anxiety/no suicidal ideation/no memory loss/no auditory hallucinations/no hyperactivity/no agitation/no visual hallucinations

## 2019-09-30 NOTE — ED PROVIDER NOTE - ATTENDING CONTRIBUTION TO CARE
50F h/o HTN p/w R side neck pain x 2 weeks, rad to RUE, atraumatic.  thoguht it was due to sleeping position.  Paresthesia RUE.  Pt going to gym and reports RUE weakness.  Pt came in due to concern for stroke.  Pt reports slight weakness in RLE.  No focal deficit, normal strength.  EKG showing LVH.  Pt mildly SOB here.  concern for PE - check dimer.  Pt also reports chronic intermittent numbness R side but normal exam here, possibly cervical disc disease, advised to f/u PMD for that.  If dimer or CTA neg would consider CDU for echo.  PMD Hakan Soto.    VS:  unremarkable except mild tachypnea    GEN - mild SOB.; A+O x3   HEAD - NC/AT     ENT - PEERL, EOMI, mucous membranes  moist , no discharge      NECK: Neck supple, non-tender without lymphadenopathy, no masses, no JVD  PULM - CTA b/l,  symmetric breath sounds  COR -  normal heart sounds    ABD - , ND, NT, soft,  BACK - no CVA tenderness, nontender spine     EXTREMS - no edema, no deformity, warm and well perfused    SKIN - no rash or bruising      NEUROLOGIC - alert, CN 2-12 intact, sensation nl, motor no focal deficit.

## 2019-10-01 ENCOUNTER — TRANSCRIPTION ENCOUNTER (OUTPATIENT)
Age: 49
End: 2019-10-01

## 2019-10-01 PROBLEM — I10 ESSENTIAL (PRIMARY) HYPERTENSION: Chronic | Status: ACTIVE | Noted: 2019-09-30

## 2019-10-01 PROBLEM — M19.90 UNSPECIFIED OSTEOARTHRITIS, UNSPECIFIED SITE: Chronic | Status: ACTIVE | Noted: 2019-09-30

## 2019-10-01 PROBLEM — E78.5 HYPERLIPIDEMIA, UNSPECIFIED: Chronic | Status: ACTIVE | Noted: 2019-09-30

## 2019-10-01 PROBLEM — E05.10 THYROTOXICOSIS WITH TOXIC SINGLE THYROID NODULE WITHOUT THYROTOXIC CRISIS OR STORM: Chronic | Status: ACTIVE | Noted: 2019-09-30

## 2019-10-01 LAB
ANION GAP SERPL CALC-SCNC: 14 MMO/L — SIGNIFICANT CHANGE UP (ref 7–14)
BUN SERPL-MCNC: 12 MG/DL — SIGNIFICANT CHANGE UP (ref 7–23)
CALCIUM SERPL-MCNC: 9.2 MG/DL — SIGNIFICANT CHANGE UP (ref 8.4–10.5)
CHLORIDE SERPL-SCNC: 107 MMOL/L — SIGNIFICANT CHANGE UP (ref 98–107)
CHOLEST SERPL-MCNC: 141 MG/DL — SIGNIFICANT CHANGE UP (ref 120–199)
CO2 SERPL-SCNC: 18 MMOL/L — LOW (ref 22–31)
CREAT SERPL-MCNC: 0.58 MG/DL — SIGNIFICANT CHANGE UP (ref 0.5–1.3)
GLUCOSE SERPL-MCNC: 94 MG/DL — SIGNIFICANT CHANGE UP (ref 70–99)
HBA1C BLD-MCNC: 5.6 % — SIGNIFICANT CHANGE UP (ref 4–5.6)
HCT VFR BLD CALC: 43.1 % — SIGNIFICANT CHANGE UP (ref 34.5–45)
HDLC SERPL-MCNC: 40 MG/DL — LOW (ref 45–65)
HGB BLD-MCNC: 13.6 G/DL — SIGNIFICANT CHANGE UP (ref 11.5–15.5)
LIPID PNL WITH DIRECT LDL SERPL: 92 MG/DL — SIGNIFICANT CHANGE UP
MAGNESIUM SERPL-MCNC: 2 MG/DL — SIGNIFICANT CHANGE UP (ref 1.6–2.6)
MCHC RBC-ENTMCNC: 27.8 PG — SIGNIFICANT CHANGE UP (ref 27–34)
MCHC RBC-ENTMCNC: 31.6 % — LOW (ref 32–36)
MCV RBC AUTO: 88 FL — SIGNIFICANT CHANGE UP (ref 80–100)
NRBC # FLD: 0 K/UL — SIGNIFICANT CHANGE UP (ref 0–0)
PLATELET # BLD AUTO: 288 K/UL — SIGNIFICANT CHANGE UP (ref 150–400)
PMV BLD: 9.9 FL — SIGNIFICANT CHANGE UP (ref 7–13)
POTASSIUM SERPL-MCNC: 3.8 MMOL/L — SIGNIFICANT CHANGE UP (ref 3.5–5.3)
POTASSIUM SERPL-SCNC: 3.8 MMOL/L — SIGNIFICANT CHANGE UP (ref 3.5–5.3)
RBC # BLD: 4.9 M/UL — SIGNIFICANT CHANGE UP (ref 3.8–5.2)
RBC # FLD: 13.2 % — SIGNIFICANT CHANGE UP (ref 10.3–14.5)
SODIUM SERPL-SCNC: 139 MMOL/L — SIGNIFICANT CHANGE UP (ref 135–145)
TRIGL SERPL-MCNC: 106 MG/DL — SIGNIFICANT CHANGE UP (ref 10–149)
WBC # BLD: 7.28 K/UL — SIGNIFICANT CHANGE UP (ref 3.8–10.5)
WBC # FLD AUTO: 7.28 K/UL — SIGNIFICANT CHANGE UP (ref 3.8–10.5)

## 2019-10-01 PROCEDURE — 93306 TTE W/DOPPLER COMPLETE: CPT | Mod: 26

## 2019-10-01 RX ORDER — METHIMAZOLE 10 MG/1
0.5 TABLET ORAL
Qty: 0 | Refills: 0 | DISCHARGE

## 2019-10-01 RX ADMIN — ATORVASTATIN CALCIUM 10 MILLIGRAM(S): 80 TABLET, FILM COATED ORAL at 21:38

## 2019-10-01 RX ADMIN — ZOLPIDEM TARTRATE 5 MILLIGRAM(S): 10 TABLET ORAL at 21:39

## 2019-10-01 RX ADMIN — LOSARTAN POTASSIUM 100 MILLIGRAM(S): 100 TABLET, FILM COATED ORAL at 05:32

## 2019-10-01 NOTE — DISCHARGE NOTE PROVIDER - NSDCCPCAREPLAN_GEN_ALL_CORE_FT
PRINCIPAL DISCHARGE DIAGNOSIS  Diagnosis: SOB (shortness of breath)  Assessment and Plan of Treatment: You were seen by ENT and a Flexible Larygoscopy was performed.  Airway is patent.  You can follow up with ENT at [phone].  You had an Echocardiogram that was normal.      SECONDARY DISCHARGE DIAGNOSES  Diagnosis: Thyroid nodule, toxic or with hyperthyroidism  Assessment and Plan of Treatment: An appointment with Endocrinology has been made for you with Dr. Burton at [address] [facility], [address], [place], [place] on October 16 at 915am.  If you are unable to make this appointemnt,, please call [phone].

## 2019-10-01 NOTE — DISCHARGE NOTE PROVIDER - NSDCQMSTROKE_NEU_ALL_CORE
Spoke with pt's PMD Dr. Stevan Astudillo [phone]). Pt had normal kidney function on 2/2018 BUN/SCr 21/1.1 and potassium 4.2, He will fax lab results to hospitalist office. Dr. Astudillo would like patient to see him in office with in 5 days from discharge.
No

## 2019-10-01 NOTE — DISCHARGE NOTE PROVIDER - HOSPITAL COURSE
50 y/o female with PMH of HTN, HLD, hyperthyroid with multiple thyroid nodules on methimazole, arthritis who presents to [facility] ED complaining of SOB for 3 days and dizziness requiring admission for enlarged thyroid nodules.        Hospital Course:            1. Shortness of breath possibly due to goiter - EKG NSR RBBB @ 76    CXR Clear     D-Dimer <150    CT Neck Heterogeneousenlarged thyroid gland likely representing a goiter. Clinical correlation is advised. There is associated mild rightward  tracheal displacement and tracheal narrowing. No substernal extension.    -ENT -Flex Laryngoscopy-Airway patent    -Echo-normal        Dispo- stable for discharge home with outpatient followup with ENT and Endocrinology

## 2019-10-01 NOTE — PROGRESS NOTE ADULT - ATTENDING COMMENTS
Patient was seen and examined,interim events noted,labs and radiology studies reviewed.  Bryce Vuong MD,FACC.  [address] [place] [address].  [id number].  [phone]

## 2019-10-01 NOTE — DISCHARGE NOTE PROVIDER - CARE PROVIDER_API CALL
Dante Linares)  Otolaryngology  [address] [place] [address], [address]  [place] [address]  Phone: [phone]  Fax: [phone]  Follow Up Time:

## 2019-10-01 NOTE — CHART NOTE - NSCHARTNOTEFT_GEN_A_CORE
Spoke with primary team. Endocrine consult called to establish care for stable hyperthyroidism on Methimazole. Patient desiring to change outpatient endocrinologist. She was seen by ENT for Goiter with recommendations for thyroid testing. TSH is normal on home dose methimazole with normal WBC and LFTs. Hemodynamically patient is stable with normal temp and HR. Would not recommend any changes to Methimazole at this time given stability on medication. Agree with ENT regarding thyroid ultrasound, consideration for biopsy of any nodules >1 cm, and f/u outpatient with ENT for compressive symptoms and outpatient surgical consideration for thyroidectomy. No acute inpatient endocrine input since patient is hemodynamically stable at this time. Recommend establish care outpatient with our endocrine office at [address] [place] [address], [address], [place], [place]. Phone [phone] for appointment changes.    We have expedited her appointment with our office to establish care and to be seen prior to outpatient thyroidectomy consideration.   Appointment information: Wednesday, October 16 at 9:15 AM with Dr. Julia Burton at [address] [place] [address] (entrance on [facility]), [address], [place] [address]. Please call my office at [phone] incase of any schedule change or cancellation.     Discussed with primary team PA to inform patient of above.     Julia Burton DO  Endocrine Attending  Pager: [phone]

## 2019-10-02 ENCOUNTER — TRANSCRIPTION ENCOUNTER (OUTPATIENT)
Age: 49
End: 2019-10-02

## 2019-10-02 VITALS
RESPIRATION RATE: 18 BRPM | OXYGEN SATURATION: 100 % | HEART RATE: 76 BPM | TEMPERATURE: 98 F | SYSTOLIC BLOOD PRESSURE: 105 MMHG | DIASTOLIC BLOOD PRESSURE: 54 MMHG

## 2019-10-02 RX ADMIN — LOSARTAN POTASSIUM 100 MILLIGRAM(S): 100 TABLET, FILM COATED ORAL at 05:29

## 2019-10-02 NOTE — PROGRESS NOTE ADULT - PROBLEM SELECTOR PLAN 3
Continue Losartan  Monitor BP medications and adjust as necessary  DASH diet
Continue Losartan  Monitor BP medications and adjust as necessary  DASH diet

## 2019-10-02 NOTE — DISCHARGE NOTE NURSING/CASE MANAGEMENT/SOCIAL WORK - PATIENT PORTAL LINK FT
You can access the FollowMyHealth Patient Portal offered by [facility] by registering at the following website: [email or web address]. By joining [facility]’s FollowMyHealth portal, you will also be able to view your health information using other applications (apps) compatible with our system.

## 2019-10-02 NOTE — PROGRESS NOTE ADULT - ASSESSMENT
48 y/o female with PMH of HTN, HLD, hyperthyroid with multiple thyroid nodules on methimazole, arthritis who presents to [facility] ED complaining of SOB for 3 days and dizziness requiring admission for enlarged thyroid nodules.    Problem/Plan - 1:  ·  Problem: Thyroid nodule, toxic or with hyperthyroidism.  Plan: Admit to medicine  Concern that goiter could be causing compressive symptoms  CT soft tissue neck with IV contrast Noted mild tracheal compression  ENT Consult noted    Problem/Plan - 2:  ·  Problem: Right bundle branch block.  Plan: Patient known to have an EKG abnormality for about 10 years.  -TTE LV Function      Problem/Plan - 3:  ·  Problem: Essential hypertension.  Plan: Continue Losartan  Monitor BP medications and adjust as necessary  DASH diet.     Problem/Plan - 4:  ·  Problem: Hyperlipidemia.  Plan: Continue atorvastatin  Lipid panel.     Problem/Plan - 5:  ·  Problem: Prophylactic measure.  Plan: Compression stockings.
Pt is a 48 y/o female with PMH of HTN, HLD, hyperthyroid with multiple thyroid nodules on methimazole, arthritis who presents to [facility] ED complaining of SOB for 3 days and dizziness requiring admission for enlarged thyroid nodules.
Pt is a 48 y/o female with PMH of HTN, HLD, hyperthyroid with multiple thyroid nodules on methimazole, arthritis who presents to [facility] ED complaining of SOB for 3 days and dizziness requiring admission for enlarged thyroid nodules.

## 2019-10-02 NOTE — PROGRESS NOTE ADULT - REASON FOR ADMISSION
SOB and dizziness sudden onset

## 2019-10-11 PROBLEM — Z00.00 ENCOUNTER FOR PREVENTIVE HEALTH EXAMINATION: Status: ACTIVE | Noted: 2019-10-11

## 2019-10-16 ENCOUNTER — APPOINTMENT (OUTPATIENT)
Dept: ENDOCRINOLOGY | Facility: CLINIC | Age: 49
End: 2019-10-16

## 2021-07-16 NOTE — H&P ADULT - NEGATIVE ENDOCRINE SYMPTOMS
normal...
no cold intolerance/no voice change/no striae
Valtrex Counseling: I discussed with the patient the risks of valacyclovir including but not limited to kidney damage, nausea, vomiting and severe allergy.  The patient understands that if the infection seems to be worsening or is not improving, they are to call.

## 2023-03-13 NOTE — PATIENT PROFILE ADULT - FALL HARM RISK TYPE OF ASSESSMENT
Hydroxyzine Counseling: Patient advised that the medication is sedating and not to drive a car after taking this medication.  Patient informed of potential adverse effects including but not limited to dry mouth, urinary retention, and blurry vision.  The patient verbalized understanding of the proper use and possible adverse effects of hydroxyzine.  All of the patient's questions and concerns were addressed. admission

## 2025-05-19 ENCOUNTER — NON-APPOINTMENT (OUTPATIENT)
Age: 55
End: 2025-05-19

## 2025-05-20 ENCOUNTER — APPOINTMENT (OUTPATIENT)
Dept: PODIATRY | Facility: CLINIC | Age: 55
End: 2025-05-20
Payer: COMMERCIAL

## 2025-05-20 DIAGNOSIS — F17.200 NICOTINE DEPENDENCE, UNSPECIFIED, UNCOMPLICATED: ICD-10-CM

## 2025-05-20 DIAGNOSIS — I10 ESSENTIAL (PRIMARY) HYPERTENSION: ICD-10-CM

## 2025-05-20 DIAGNOSIS — M77.31 CALCANEAL SPUR, RIGHT FOOT: ICD-10-CM

## 2025-05-20 DIAGNOSIS — Z82.49 FAMILY HISTORY OF ISCHEMIC HEART DISEASE AND OTHER DISEASES OF THE CIRCULATORY SYSTEM: ICD-10-CM

## 2025-05-20 DIAGNOSIS — Z87.442 PERSONAL HISTORY OF URINARY CALCULI: ICD-10-CM

## 2025-05-20 DIAGNOSIS — M77.9 ENTHESOPATHY, UNSPECIFIED: ICD-10-CM

## 2025-05-20 DIAGNOSIS — Z78.0 ASYMPTOMATIC MENOPAUSAL STATE: ICD-10-CM

## 2025-05-20 DIAGNOSIS — Z78.9 OTHER SPECIFIED HEALTH STATUS: ICD-10-CM

## 2025-05-20 DIAGNOSIS — M76.62 ACHILLES TENDINITIS, LEFT LEG: ICD-10-CM

## 2025-05-20 DIAGNOSIS — M76.61 ACHILLES TENDINITIS, RIGHT LEG: ICD-10-CM

## 2025-05-20 DIAGNOSIS — M77.32 CALCANEAL SPUR, LEFT FOOT: ICD-10-CM

## 2025-05-20 PROCEDURE — 99204 OFFICE O/P NEW MOD 45 MIN: CPT

## 2025-05-20 PROCEDURE — 73610 X-RAY EXAM OF ANKLE: CPT | Mod: RT

## 2025-05-20 RX ORDER — LEVOTHYROXINE SODIUM 112 UG/1
112 CAPSULE ORAL
Refills: 0 | Status: ACTIVE | COMMUNITY

## 2025-05-20 RX ORDER — ATORVASTATIN CALCIUM 10 MG/1
10 TABLET, FILM COATED ORAL
Refills: 0 | Status: ACTIVE | COMMUNITY

## 2025-05-20 RX ORDER — MELOXICAM 15 MG/1
15 TABLET ORAL DAILY
Qty: 14 | Refills: 0 | Status: ACTIVE | COMMUNITY
Start: 2025-05-20 | End: 1900-01-01

## 2025-05-20 RX ORDER — IRBESARTAN 300 MG/1
300 TABLET ORAL
Refills: 0 | Status: ACTIVE | COMMUNITY

## 2025-05-20 RX ORDER — METOPROLOL SUCCINATE 25 MG/1
25 TABLET, EXTENDED RELEASE ORAL
Refills: 0 | Status: ACTIVE | COMMUNITY

## 2025-06-10 ENCOUNTER — APPOINTMENT (OUTPATIENT)
Dept: PODIATRY | Facility: CLINIC | Age: 55
End: 2025-06-10
Payer: COMMERCIAL

## 2025-06-10 PROCEDURE — 99213 OFFICE O/P EST LOW 20 MIN: CPT

## 2025-06-10 RX ORDER — MELOXICAM 15 MG/1
15 TABLET ORAL DAILY
Qty: 10 | Refills: 0 | Status: ACTIVE | COMMUNITY
Start: 2025-06-10 | End: 1900-01-01

## 2025-06-25 ENCOUNTER — APPOINTMENT (OUTPATIENT)
Dept: SURGICAL ONCOLOGY | Facility: CLINIC | Age: 55
End: 2025-06-25
Payer: COMMERCIAL

## 2025-06-25 ENCOUNTER — NON-APPOINTMENT (OUTPATIENT)
Age: 55
End: 2025-06-25

## 2025-06-25 VITALS
BODY MASS INDEX: 28.12 KG/M2 | HEIGHT: 66 IN | WEIGHT: 175 LBS | OXYGEN SATURATION: 98 % | HEART RATE: 81 BPM | SYSTOLIC BLOOD PRESSURE: 148 MMHG | RESPIRATION RATE: 17 BRPM | DIASTOLIC BLOOD PRESSURE: 78 MMHG

## 2025-06-25 PROBLEM — D05.10 DCIS (DUCTAL CARCINOMA IN SITU) OF BREAST: Status: ACTIVE | Noted: 2025-06-25

## 2025-06-25 PROCEDURE — 99205 OFFICE O/P NEW HI 60 MIN: CPT

## 2025-06-26 ENCOUNTER — RESULT REVIEW (OUTPATIENT)
Age: 55
End: 2025-06-26

## 2025-07-02 ENCOUNTER — APPOINTMENT (OUTPATIENT)
Dept: MRI IMAGING | Facility: IMAGING CENTER | Age: 55
End: 2025-07-02

## 2025-07-02 ENCOUNTER — OUTPATIENT (OUTPATIENT)
Dept: OUTPATIENT SERVICES | Facility: HOSPITAL | Age: 55
LOS: 1 days | End: 2025-07-02
Payer: COMMERCIAL

## 2025-07-02 DIAGNOSIS — D05.10 INTRADUCTAL CARCINOMA IN SITU OF UNSPECIFIED BREAST: ICD-10-CM

## 2025-07-02 PROCEDURE — 77049 MRI BREAST C-+ W/CAD BI: CPT | Mod: 26

## 2025-07-02 PROCEDURE — C8937: CPT

## 2025-07-02 PROCEDURE — A9585: CPT

## 2025-07-02 PROCEDURE — C8908: CPT

## 2025-07-08 ENCOUNTER — APPOINTMENT (OUTPATIENT)
Dept: MRI IMAGING | Facility: CLINIC | Age: 55
End: 2025-07-08
Payer: COMMERCIAL

## 2025-07-08 ENCOUNTER — RESULT REVIEW (OUTPATIENT)
Age: 55
End: 2025-07-08

## 2025-07-08 PROCEDURE — 19085Z: CUSTOM | Mod: RT

## 2025-07-08 PROCEDURE — A9585: CPT | Mod: NC,JW

## 2025-07-08 PROCEDURE — 77065 DX MAMMO INCL CAD UNI: CPT | Mod: RT

## 2025-07-08 PROCEDURE — A4648: CPT

## 2025-07-11 ENCOUNTER — NON-APPOINTMENT (OUTPATIENT)
Age: 55
End: 2025-07-11

## 2025-07-14 PROBLEM — N60.91 ATYPICAL DUCTAL HYPERPLASIA OF RIGHT BREAST: Status: ACTIVE | Noted: 2025-07-14

## 2025-08-01 ENCOUNTER — APPOINTMENT (OUTPATIENT)
Dept: MRI IMAGING | Facility: IMAGING CENTER | Age: 55
End: 2025-08-01

## 2025-08-04 ENCOUNTER — OUTPATIENT (OUTPATIENT)
Dept: OUTPATIENT SERVICES | Facility: HOSPITAL | Age: 55
LOS: 1 days | End: 2025-08-04

## 2025-08-04 VITALS
WEIGHT: 192.9 LBS | SYSTOLIC BLOOD PRESSURE: 114 MMHG | HEIGHT: 65.25 IN | RESPIRATION RATE: 16 BRPM | TEMPERATURE: 98 F | OXYGEN SATURATION: 99 % | DIASTOLIC BLOOD PRESSURE: 73 MMHG | HEART RATE: 65 BPM

## 2025-08-04 DIAGNOSIS — D05.10 INTRADUCTAL CARCINOMA IN SITU OF UNSPECIFIED BREAST: ICD-10-CM

## 2025-08-04 DIAGNOSIS — Z91.89 OTHER SPECIFIED PERSONAL RISK FACTORS, NOT ELSEWHERE CLASSIFIED: ICD-10-CM

## 2025-08-04 DIAGNOSIS — E03.9 HYPOTHYROIDISM, UNSPECIFIED: ICD-10-CM

## 2025-08-04 DIAGNOSIS — I10 ESSENTIAL (PRIMARY) HYPERTENSION: ICD-10-CM

## 2025-08-04 DIAGNOSIS — Z98.890 OTHER SPECIFIED POSTPROCEDURAL STATES: Chronic | ICD-10-CM

## 2025-08-04 DIAGNOSIS — E78.5 HYPERLIPIDEMIA, UNSPECIFIED: ICD-10-CM

## 2025-08-04 DIAGNOSIS — F41.9 ANXIETY DISORDER, UNSPECIFIED: ICD-10-CM

## 2025-08-04 DIAGNOSIS — K21.9 GASTRO-ESOPHAGEAL REFLUX DISEASE WITHOUT ESOPHAGITIS: ICD-10-CM

## 2025-08-05 ENCOUNTER — APPOINTMENT (OUTPATIENT)
Dept: PODIATRY | Facility: CLINIC | Age: 55
End: 2025-08-05

## 2025-08-06 ENCOUNTER — OUTPATIENT (OUTPATIENT)
Dept: OUTPATIENT SERVICES | Facility: HOSPITAL | Age: 55
LOS: 1 days | End: 2025-08-06
Payer: COMMERCIAL

## 2025-08-06 ENCOUNTER — APPOINTMENT (OUTPATIENT)
Dept: MAMMOGRAPHY | Facility: IMAGING CENTER | Age: 55
End: 2025-08-06
Payer: COMMERCIAL

## 2025-08-06 DIAGNOSIS — D05.10 INTRADUCTAL CARCINOMA IN SITU OF UNSPECIFIED BREAST: ICD-10-CM

## 2025-08-06 DIAGNOSIS — Z98.890 OTHER SPECIFIED POSTPROCEDURAL STATES: Chronic | ICD-10-CM

## 2025-08-06 DIAGNOSIS — N60.91 UNSPECIFIED BENIGN MAMMARY DYSPLASIA OF RIGHT BREAST: ICD-10-CM

## 2025-08-06 PROBLEM — K21.9 GASTRO-ESOPHAGEAL REFLUX DISEASE WITHOUT ESOPHAGITIS: Chronic | Status: ACTIVE | Noted: 2025-08-04

## 2025-08-06 PROBLEM — E03.9 HYPOTHYROIDISM, UNSPECIFIED: Chronic | Status: ACTIVE | Noted: 2025-08-04

## 2025-08-06 PROBLEM — F41.8 OTHER SPECIFIED ANXIETY DISORDERS: Chronic | Status: ACTIVE | Noted: 2025-08-04

## 2025-08-06 PROCEDURE — 19281 PERQ DEVICE BREAST 1ST IMAG: CPT | Mod: RT

## 2025-08-06 PROCEDURE — 19281 PERQ DEVICE BREAST 1ST IMAG: CPT

## 2025-08-06 PROCEDURE — A4648: CPT

## 2025-08-11 ENCOUNTER — OUTPATIENT (OUTPATIENT)
Dept: OUTPATIENT SERVICES | Facility: HOSPITAL | Age: 55
LOS: 1 days | End: 2025-08-11
Payer: COMMERCIAL

## 2025-08-11 ENCOUNTER — RESULT REVIEW (OUTPATIENT)
Age: 55
End: 2025-08-11

## 2025-08-11 ENCOUNTER — APPOINTMENT (OUTPATIENT)
Dept: MAMMOGRAPHY | Facility: IMAGING CENTER | Age: 55
End: 2025-08-11
Payer: COMMERCIAL

## 2025-08-11 ENCOUNTER — TRANSCRIPTION ENCOUNTER (OUTPATIENT)
Age: 55
End: 2025-08-11

## 2025-08-11 ENCOUNTER — APPOINTMENT (OUTPATIENT)
Dept: SURGICAL ONCOLOGY | Facility: AMBULATORY SURGERY CENTER | Age: 55
End: 2025-08-11

## 2025-08-11 VITALS
TEMPERATURE: 98 F | RESPIRATION RATE: 16 BRPM | SYSTOLIC BLOOD PRESSURE: 132 MMHG | WEIGHT: 192.9 LBS | HEART RATE: 77 BPM | HEIGHT: 65.25 IN | OXYGEN SATURATION: 97 % | DIASTOLIC BLOOD PRESSURE: 65 MMHG

## 2025-08-11 VITALS
DIASTOLIC BLOOD PRESSURE: 68 MMHG | SYSTOLIC BLOOD PRESSURE: 110 MMHG | HEART RATE: 86 BPM | OXYGEN SATURATION: 100 % | RESPIRATION RATE: 19 BRPM

## 2025-08-11 DIAGNOSIS — D05.10 INTRADUCTAL CARCINOMA IN SITU OF UNSPECIFIED BREAST: ICD-10-CM

## 2025-08-11 DIAGNOSIS — Z00.8 ENCOUNTER FOR OTHER GENERAL EXAMINATION: ICD-10-CM

## 2025-08-11 DIAGNOSIS — Z98.890 OTHER SPECIFIED POSTPROCEDURAL STATES: Chronic | ICD-10-CM

## 2025-08-11 PROCEDURE — 76098 X-RAY EXAM SURGICAL SPECIMEN: CPT

## 2025-08-11 PROCEDURE — 76098 X-RAY EXAM SURGICAL SPECIMEN: CPT | Mod: 26,76

## 2025-08-11 PROCEDURE — 88377 M/PHMTRC ALYS ISHQUANT/SEMIQ: CPT | Mod: 26

## 2025-08-11 PROCEDURE — 88360 TUMOR IMMUNOHISTOCHEM/MANUAL: CPT | Mod: 26

## 2025-08-11 PROCEDURE — 88344 IMHCHEM/IMCYTCHM EA MLT ANTB: CPT | Mod: 26,59

## 2025-08-11 PROCEDURE — 88307 TISSUE EXAM BY PATHOLOGIST: CPT | Mod: 26

## 2025-08-11 DEVICE — CLIP APPLIER COVIDIEN SURGICLIP 11.5" MEDIUM: Type: IMPLANTABLE DEVICE | Site: BILATERAL BREASTS | Status: FUNCTIONAL

## 2025-08-11 DEVICE — SURGICEL FIBRILLAR 2 X 4": Type: IMPLANTABLE DEVICE | Site: BILATERAL BREASTS | Status: FUNCTIONAL

## 2025-08-11 DEVICE — AGENT HEMOSTATIC HEMOBLAST 1.65G 10CM: Type: IMPLANTABLE DEVICE | Site: BILATERAL BREASTS | Status: FUNCTIONAL

## 2025-08-11 RX ORDER — OMEPRAZOLE 20 MG/1
1 CAPSULE, DELAYED RELEASE ORAL
Refills: 0 | DISCHARGE

## 2025-08-11 RX ORDER — ESCITALOPRAM OXALATE 20 MG/1
1 TABLET ORAL
Refills: 0 | DISCHARGE

## 2025-08-11 RX ORDER — IRBESARTAN 75 MG/1
1 TABLET ORAL
Refills: 0 | DISCHARGE

## 2025-08-11 RX ORDER — MELOXICAM 15 MG/1
1 TABLET ORAL
Refills: 0 | DISCHARGE

## 2025-08-11 RX ORDER — ATORVASTATIN CALCIUM 80 MG/1
1 TABLET, FILM COATED ORAL
Refills: 0 | DISCHARGE

## 2025-08-11 RX ORDER — LEVOTHYROXINE SODIUM 300 MCG
1 TABLET ORAL
Refills: 0 | DISCHARGE

## 2025-08-11 RX ORDER — OXYCODONE HYDROCHLORIDE 30 MG/1
1 TABLET ORAL
Qty: 8 | Refills: 0
Start: 2025-08-11

## 2025-08-11 RX ORDER — AMLODIPINE BESYLATE 10 MG/1
1 TABLET ORAL
Refills: 0 | DISCHARGE

## 2025-08-20 ENCOUNTER — NON-APPOINTMENT (OUTPATIENT)
Age: 55
End: 2025-08-20

## 2025-08-20 ENCOUNTER — APPOINTMENT (OUTPATIENT)
Dept: SURGICAL ONCOLOGY | Facility: CLINIC | Age: 55
End: 2025-08-20
Payer: COMMERCIAL

## 2025-08-20 VITALS
BODY MASS INDEX: 28.12 KG/M2 | HEIGHT: 66 IN | OXYGEN SATURATION: 97 % | HEART RATE: 66 BPM | SYSTOLIC BLOOD PRESSURE: 120 MMHG | DIASTOLIC BLOOD PRESSURE: 70 MMHG | WEIGHT: 175 LBS

## 2025-08-20 DIAGNOSIS — N60.91 UNSPECIFIED BENIGN MAMMARY DYSPLASIA OF RIGHT BREAST: ICD-10-CM

## 2025-08-20 DIAGNOSIS — D05.10 INTRADUCTAL CARCINOMA IN SITU OF UNSPECIFIED BREAST: ICD-10-CM

## 2025-08-20 PROCEDURE — 99024 POSTOP FOLLOW-UP VISIT: CPT

## 2025-08-23 LAB — SURGICAL PATHOLOGY STUDY: SIGNIFICANT CHANGE UP

## 2025-08-27 ENCOUNTER — NON-APPOINTMENT (OUTPATIENT)
Age: 55
End: 2025-08-27

## 2025-09-05 ENCOUNTER — APPOINTMENT (OUTPATIENT)
Dept: HEMATOLOGY ONCOLOGY | Facility: CLINIC | Age: 55
End: 2025-09-05

## 2025-09-05 ENCOUNTER — APPOINTMENT (OUTPATIENT)
Dept: RADIATION ONCOLOGY | Facility: CLINIC | Age: 55
End: 2025-09-05
Payer: COMMERCIAL

## 2025-09-05 ENCOUNTER — NON-APPOINTMENT (OUTPATIENT)
Age: 55
End: 2025-09-05

## 2025-09-05 VITALS
OXYGEN SATURATION: 100 % | TEMPERATURE: 97.1 F | RESPIRATION RATE: 16 BRPM | SYSTOLIC BLOOD PRESSURE: 144 MMHG | WEIGHT: 193.57 LBS | BODY MASS INDEX: 31.11 KG/M2 | HEART RATE: 70 BPM | DIASTOLIC BLOOD PRESSURE: 80 MMHG

## 2025-09-05 VITALS
RESPIRATION RATE: 16 BRPM | DIASTOLIC BLOOD PRESSURE: 78 MMHG | WEIGHT: 192.9 LBS | BODY MASS INDEX: 31 KG/M2 | HEIGHT: 66.14 IN | SYSTOLIC BLOOD PRESSURE: 125 MMHG | TEMPERATURE: 97.9 F | OXYGEN SATURATION: 98 % | HEART RATE: 69 BPM

## 2025-09-05 DIAGNOSIS — D05.10 INTRADUCTAL CARCINOMA IN SITU OF UNSPECIFIED BREAST: ICD-10-CM

## 2025-09-05 DIAGNOSIS — Z23 ENCOUNTER FOR IMMUNIZATION: ICD-10-CM

## 2025-09-05 DIAGNOSIS — Z78.9 OTHER SPECIFIED HEALTH STATUS: ICD-10-CM

## 2025-09-05 DIAGNOSIS — Z86.39 PERSONAL HISTORY OF OTHER ENDOCRINE, NUTRITIONAL AND METABOLIC DISEASE: ICD-10-CM

## 2025-09-05 DIAGNOSIS — C50.912 MALIGNANT NEOPLASM OF UNSPECIFIED SITE OF LEFT FEMALE BREAST: ICD-10-CM

## 2025-09-05 DIAGNOSIS — E89.0 POSTPROCEDURAL HYPOTHYROIDISM: ICD-10-CM

## 2025-09-05 PROCEDURE — G2211 COMPLEX E/M VISIT ADD ON: CPT | Mod: NC

## 2025-09-05 PROCEDURE — 99205 OFFICE O/P NEW HI 60 MIN: CPT

## 2025-09-05 PROCEDURE — 99205 OFFICE O/P NEW HI 60 MIN: CPT | Mod: GC

## 2025-09-05 RX ORDER — AMLODIPINE BESYLATE 5 MG/1
5 TABLET ORAL
Refills: 0 | Status: ACTIVE | COMMUNITY

## 2025-09-05 RX ORDER — ZOLPIDEM TARTRATE 10 MG/1
10 TABLET ORAL
Refills: 0 | Status: ACTIVE | COMMUNITY

## 2025-09-05 RX ORDER — ESCITALOPRAM OXALATE 5 MG/1
5 TABLET ORAL
Refills: 0 | Status: ACTIVE | COMMUNITY

## 2025-09-06 PROBLEM — C50.912 INFILTRATING DUCTAL CARCINOMA OF LEFT BREAST: Status: ACTIVE | Noted: 2025-09-05

## 2025-09-12 ENCOUNTER — NON-APPOINTMENT (OUTPATIENT)
Age: 55
End: 2025-09-12

## (undated) DEVICE — DRSG TEGADERM 4 X 4.75"

## (undated) DEVICE — GOWN LG

## (undated) DEVICE — SUT SILK 2-0 18" FS

## (undated) DEVICE — POSITIONER FOAM EGG CRATE ULNAR 2PCS (PINK)

## (undated) DEVICE — RADIOGRAPHY DVC SPEC TRANSPEC

## (undated) DEVICE — DRSG TELFA 3 X 8

## (undated) DEVICE — DRAPE TOWEL BLUE 17" X 24"

## (undated) DEVICE — ELCTR GROUNDING PAD ADULT COVIDIEN

## (undated) DEVICE — DRSG MAMMARY SUPPORT LG SIZE 4

## (undated) DEVICE — SUT PLAIN GUT FAST ABSORBING 5-0 PC-1

## (undated) DEVICE — PACK MINOR NO DRAPE

## (undated) DEVICE — Device

## (undated) DEVICE — SUT MONOCRYL 4-0 27" PS-2 UNDYED

## (undated) DEVICE — SOL IRR POUR NS 0.9% 500ML

## (undated) DEVICE — SUCTION YANKAUER NO CONTROL VENT

## (undated) DEVICE — POSITIONER PATIENT SAFETY STRAP 3X60"

## (undated) DEVICE — WARMING BLANKET LOWER ADULT

## (undated) DEVICE — LAP PAD W RING 18 X 18"

## (undated) DEVICE — DRSG MAMMARY SUPPORT MED SIZE 3

## (undated) DEVICE — SOL IRR POUR H2O 500ML

## (undated) DEVICE — ELCTR BOVIE TIP BLADE INSULATED 4" EDGE

## (undated) DEVICE — SUT MONOCRYL 3-0 18" PS-2 UNDYED

## (undated) DEVICE — DRSG MAMMARY SUPPORT SM SIZE 1

## (undated) DEVICE — DRSG MAMMARY SUPPORT XL SIZE 5

## (undated) DEVICE — DRAPE 3/4 SHEET 52X76"

## (undated) DEVICE — ELCTR ROCKER SWITCH PENCIL BLUE 10FT

## (undated) DEVICE — NDL HYPO SAFE 25G X 1" (ORANGE)

## (undated) DEVICE — DRSG STERISTRIPS 0.5 X 4"

## (undated) DEVICE — DRAPE INSTRUMENT POUCH 6.75" X 11"

## (undated) DEVICE — VENODYNE/SCD SLEEVE CALF MEDIUM

## (undated) DEVICE — DRSG MAMMARY SUPPORT MED SIZE 2

## (undated) DEVICE — DRAPE CHEST BREAST 106" X 122"